# Patient Record
Sex: FEMALE | Race: WHITE
[De-identification: names, ages, dates, MRNs, and addresses within clinical notes are randomized per-mention and may not be internally consistent; named-entity substitution may affect disease eponyms.]

---

## 2018-03-10 ENCOUNTER — HOSPITAL ENCOUNTER (EMERGENCY)
Dept: HOSPITAL 75 - ER | Age: 43
Discharge: ANOTHER HOSPITAL | End: 2018-03-10
Payer: COMMERCIAL

## 2018-03-10 VITALS — SYSTOLIC BLOOD PRESSURE: 157 MMHG | DIASTOLIC BLOOD PRESSURE: 99 MMHG

## 2018-03-10 VITALS — WEIGHT: 160 LBS | BODY MASS INDEX: 24.25 KG/M2 | HEIGHT: 68 IN
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
## 2018-03-10 DIAGNOSIS — Z88.8: ICD-10-CM

## 2018-03-10 DIAGNOSIS — S68.115A: Primary | ICD-10-CM

## 2018-03-10 DIAGNOSIS — Z88.1: ICD-10-CM

## 2018-03-10 DIAGNOSIS — Z98.51: ICD-10-CM

## 2018-03-10 DIAGNOSIS — S63.285A: ICD-10-CM

## 2018-03-10 DIAGNOSIS — Z88.0: ICD-10-CM

## 2018-03-10 DIAGNOSIS — Z88.2: ICD-10-CM

## 2018-03-10 PROCEDURE — 73140 X-RAY EXAM OF FINGER(S): CPT

## 2018-03-10 PROCEDURE — 64450 NJX AA&/STRD OTHER PN/BRANCH: CPT

## 2018-03-10 PROCEDURE — 90715 TDAP VACCINE 7 YRS/> IM: CPT

## 2018-03-10 NOTE — ED UPPER EXTREMITY
General


Chief Complaint:  Upper Extremity


Stated Complaint:  CUT L HAND R FINGER


Nursing Triage Note:  


Pt. advises she was tying up a mare when the mare pulled back and cut her left 


ring finger off with the lead rope.


Nursing Sepsis Screen:  No Definite Risk





History of Present Illness


Date Seen by Provider:  Mar 10, 2018


Time Seen by Provider:  15:45


Initial Comments


43-year-old  female reports that she was working with her horse when a 

rope came between a pipe on the fence and her finger. As the horse pulled back, 

the rope amputated the 4th finger of her left hand at the DIP joint. They put 

dressing on the finger and drove directly here with the amputated tip on ice. 

She also complains of a rope burn under her chin. She ate chips for lunch at 

approximately 12:00 today, nothing to eat or drink since then


She is otherwise healthy on no medications routinely, she was started on 

Tamiflu on 3/5/18 by her primary care provider for fevers and body aches. She 

was not tested for influenza. She denies a fever for the last 4 days, no cough 

or body aches at this time.


Onset:  just prior to arrival


Pain/Injury Location:  left 4th finger


Method of Injury:  incised (by rope)


Modifying Factors:  Improves With Rest





Allergies and Home Medications


Allergies


Coded Allergies:  


     Penicillins (Verified  Allergy, Unknown, 3/10/18)


     sulfamethoxazole (Verified  Allergy, Unknown, 3/10/18)


     trimethoprim (Verified  Allergy, Unknown, 3/10/18)





Patient Home Medication List


Home Medication List Reviewed:  Yes





Constitutional:  no symptoms reported, see HPI


Pregnant:  No (hysterectomy)


Musculoskeletal:  see HPI, joint pain (PIP and DIP joint left fourth finger)


Skin:  see HPI, other (amputation at the DIP joint of the fourth finger left 

hand. Mangled skin and soft tissue at stump site.)


All Other Systems Reviewed


Negative Unless Noted:  Yes





Past Medical-Social-Family Hx


Patient Social History


Alcohol Use:  Denies Use


Recreational Drug Use:  No


Smoking Status:  Never a Smoker


Recent Foreign Travel:  No


Contact w/Someone Who Travel:  No


Recent Infectious Disease Expo:  No


Physical Abuse:  No


Sexual Abuse:  No





Reproductive System


GYN History:  Tubal Ligation





Psychosocial


Suicide Risk Score:  0





Reviewed Nursing Assessment


Reviewed/Agree w Nursing PMH:  Yes





Physical Exam


Vital Signs





Vital Signs - First Documented








 3/10/18





 16:00


 


Temp 97.1


 


Pulse 89


 


Resp 14


 


B/P (MAP) 137/92 (107)


 


Pulse Ox 98


 


O2 Delivery Room Air





Capillary Refill : Less Than 3 Seconds


General Appearance:  WD/WN, no apparent distress


HEENT:  PERRL/EOMI, normal ENT inspection, TMs normal, pharynx normal


Neck:  non-tender, full range of motion, supple, normal inspection


Cardiovascular:  normal peripheral pulses, regular rate, rhythm, no murmur


Respiratory:  chest non-tender, lungs clear, normal breath sounds, no 

respiratory distress


Gastrointestinal:  normal bowel sounds, non tender, soft


Wrist:  Yes normal inspection, Yes non-tender, Yes no evidence of injury (left)


Hand:  Left, bone tenderness, deformity, laceration (complete amputation at the 

DIP joint, exposed distal phalanx, mangled skin and soft tissue, slight oozing 

of blood no active bleeding.), soft tissue tenderness


Neurologic/Psychiatric:  no motor/sensory deficits, alert, normal mood/affect, 

oriented x 3


Skin:  normal color, warm/dry





Progress/Results/Core Measures


Results/Orders


My Orders





Orders - REMBERTO CALDERON


Silver Sulfadiazine 50 Gm (Ssd 1% 50 Gm) (3/10/18 16:27)





Medications Given in ED





Vital Signs/I&O











Blood Pressure Mean:  107








Progress Note :  


   Time:  15:45


Progress Note


Initial evaluation completed with Dr. Rowland. Plan for pain management with 

fentanyl IV, digital block, x-ray and clindamycin for antibiotic coverage with 

allergy to penicillin and sulfas. Tetanus vaccine given. Ring to left fourth 

finger was cut off successfully by Sandra Escobar RN.


1600 digital block with 12 mls of 1% lidocaine, skin prepped with Betadine. She 

tolerated procedure with no complications. Reports no pain at this time. 


1615 x-ray shows a dislocation at the PIP joint with volar displacement and 

shortening of Middle Phalynx, and fracture fragments at PIP joint. Middle 

Phalynx intact, due to this not enough soft tissue to cover if reduced. Will 

likely necessitate amputation to the PIP, Dr. Rowland and I feel orthopedics are 

necessary for this management. Patient noted to have small excoriation to the 

skin under her chin, no active bleeding or drainage. She believes the rope 

scratched across this area. Silvadene cream applied. 


1620 Consult to Dr. Decker by phone for trauma call, recommended orthopedic 

evaluation.Call to Dr. Tad Lucas, orthopedics on-call for [facility], 

described patient's injury and x-rays. Recommended transfer to a hand surgeon. 

Requested he come and evaluate the patient to make that determination, stated 

he was not a hand specialist and it needed to be transferred to a facility with 

a hand surgeon.  Discussed this with Dr. Rowland, agreed with recommendation to 

try [facility] or [facility] in [place], [place]. 


1630 Spoke to Dr. Orozco at [place], [place], Orthopedic Surgeon, agreed to 

accept patient but stated this could be completed by any orthopedic surgeon and 

requested names of all providers involved. Spoke to Dr. Cisneros in ED at [facility], 

agreed to accept the patient. She is to stay NPO. 


1645 discussed recommendation for transfer to [facility] with the patient and her 

. They understand that she is to not eat or drink anything. Copies of ER 

report and x-rays on disc sent with them. Transfer paperwork completed. Sterile 

bulky dressing applied to left fourth finger and reinforced. She is to keep it 

iced and elevated for the car ride. Discharge instructions reviewed, all 

questions answered.





Diagnostic Imaging





   Diagonstic Imaging:  Xray


   Plain Films/CT/US/NM/MRI:  hand


Comments





NAME:      TEOFILO HERBERT


MED REC#:   [id number]


ACCOUNT#:   [id number]


PHYSICIAN:    LINDY ROWLAND DO


 








CC:   LINDY ROWLAND DO; GUERO MONTIEL DO


 Page 1 of 1


 RADIOLOGY REPORT





 [facility] [place], [place]





CC:   LINDY ROWLAND DO; GUERO MONTIEL DO


 Page 1 of 1


 RADIOLOGY REPORT





NAME:      TEOFILO HERBERT


MED REC#:   [id number]


ACCOUNT#:   [id number]


PT STATUS:   REG ER


:      1975


PHYSICIAN:    LINDY ROWLAND DO


ADMIT DATE:   03/10/18/ER


 ***Signed***


Date of Exam:   03/10/18





FINGER(S)


 





INDICATION: Injury. Pain.





COMPARISON: None





FINDINGS: 3 views of the left fourth finger are obtained. There


is amputation of the distal phalanx of the left fourth finger.


There is also dislocation of the PIP joint of the left fourth


finger with ventral and proximal dislocation of the middle


phalanx with respect to the proximal phalanx. There is several


small osseous fragments adjacent to the proximal aspect of the


middle phalanx consistent with small fractures.





IMPRESSION: There is amputation of the left fourth finger at the


level of the DIP joint and there is fracture dislocation at the


PIP joint as described.





Dictated by: 





  Dictated on workstation # [id number]





[id number]





Dict:      03/10/18 163


Trans:      03/10/18 1652





Interpreted by:         GUERO MONTIEL DO


Electronically signed by:   GUERO MONTIEL DO 03/10/18 1652


   Reviewed:  Reviewed by Me





Departure


Impression


Impression:  


 Primary Impression:  


 Amputation, finger, traumatic


 Qualified Codes:  S68.119A - Complete traumatic metacarpophalangeal amputation 

of unspecified finger, initial encounter


 Additional Impression:  


 Dislocation of PIP joint of finger


 Qualified Codes:  S63.289A - Dislocation of proximal interphalangeal joint of 

unspecified finger, initial encounter


Disposition:   XFER [facility]-TRM HOSP


Condition:  Stable





Transfer


Time Spoke to Accepting Phy:  16:45


Transfer Progress Notes


Spoke to Ortho on Call, General Orthopedist Dr. Doty. ED accepting Dr. Guevara.


Transfer Facility:  


[place], [place]


Method of Transfer:  Private Vehicle





Departure-Patient Inst.


Referrals:  


ROSLYN TRONCOSO DNP (PCP/Family)


Primary Care Physician


Patient Instructions:  Amputation of the Finger or Fingertip (DC), Finger 

Fracture (DC)





Add. Discharge Instructions:  


Keep ice to left fourth finger and left hand elevated.


Go directly to [place], [place]: Emergency Dept. 


Dr. Doty, orthopedist will see you.


Do Not Eat or Drink anything. 








All discharge instructions reviewed with patient and/or family. Voiced 

understanding.











REMBERTO CALDERON Mar 10, 2018 16:59

## 2018-03-10 NOTE — DIAGNOSTIC IMAGING REPORT
INDICATION: Injury. Pain.



COMPARISON: None



FINDINGS: 3 views of the left fourth finger are obtained. There

is amputation of the distal phalanx of the left fourth finger.

There is also dislocation of the PIP joint of the left fourth

finger with ventral and proximal dislocation of the middle

phalanx with respect to the proximal phalanx. There is several

small osseous fragments adjacent to the proximal aspect of the

middle phalanx consistent with small fractures.



IMPRESSION: There is amputation of the left fourth finger at the

level of the DIP joint and there is fracture dislocation at the

PIP joint as described.



Dictated by: 



  Dictated on workstation # [id number]

## 2018-03-11 NOTE — XMS REPORT
Continuity of Care Document

 Created on: 2018



Villalobos, Ms De Anda

External Reference #: [id number]

: 1975

Sex: Female



Demographics







 Address  [address] [place] [address]  [address]

 

 Home Phone  [phone] x

 

 Preferred Language  Unknown

 

 Marital Status  Unknown

 

 Sabianism Affiliation  Unknown

 

 Race  Unknown

 

 Ethnic Group  Unknown





Author







 Author  [facility]

 

 Organization  [facility]

 

 Address  Unknown

 

 Phone  Unavailable



              



Allergies

      





 Active            Description            Code            Type            
Severity            Reaction            Onset            Reported/Identified   
         Relationship to Patient            Clinical Status        

 

 Yes            Penicillins                         Drug Allergy            N/A
            N/A                         2014                             
     



                  



Medications

      



There is no data.                  



Problems

      





 Date Dx Coded            Attending            Type            Code            
Diagnosis            Diagnosed By        

 

 2014            Zuhair Bob N.P.            
782.1            Rash                     

 

 2014            Zuhair Bob N.P.            
782.1            Rash                     

 

 2014            [facility]                         V72.2       
     DENTAL EXAMINATION                     



                      



Procedures

      





 Code            Description            Performed By            Performed On   
     

 

             RL808T                                  Queried Patient for 
Tobacco Use                                   2014        

 

             30639                                  Office visit - new pt, 
level 2                                   2014        

 

             38576                                  Therapeutic, prophylactic 
or diagnostic injection; subcutaneous or intramuscular                         
          2014        

 

             NB754W                                  Queried Patient for 
Tobacco Use                                   2014        

 

             76238                                  Office/outpatient visit; 
established patient, level 4                                   2014      
  

 

             23423                                  Office/outpatient visit; 
established patient, level 3                                   2014      
  

 

                                               LIMIT ORAL EVAL PROBLM 
FOCUS                                   2014        

 

                                               INTRAORAL PERIAPICAL FIRST 
F                                   2014        



                                



Results

      



There is no data.              



Encounters

      





 ACCT No.            Visit Date/Time            Discharge            Status    
        Pt. Type            Provider            Facility            Loc./Unit  
          Complaint        

 

 [id number]            2014 08:46:22            2014 09:17:49      
      [facility]            Outpatient            [facility]         
                                      

 

 [id number]            2014 09:01:11            2014 09:40:44      
      [facility]            Outpatient            [facility]         
                                      

 

 [id number]            2014 16:21:00            2014 23:59:59          
  [facility]            Outpatient            [facility]